# Patient Record
Sex: MALE | Race: NATIVE HAWAIIAN OR OTHER PACIFIC ISLANDER | HISPANIC OR LATINO
[De-identification: names, ages, dates, MRNs, and addresses within clinical notes are randomized per-mention and may not be internally consistent; named-entity substitution may affect disease eponyms.]

---

## 2024-08-12 ENCOUNTER — APPOINTMENT (OUTPATIENT)
Dept: ORTHOPEDIC SURGERY | Facility: CLINIC | Age: 29
End: 2024-08-12
Payer: COMMERCIAL

## 2024-08-12 DIAGNOSIS — M23.91 UNSPECIFIED INTERNAL DERANGEMENT OF RIGHT KNEE: ICD-10-CM

## 2024-08-12 PROBLEM — Z00.00 ENCOUNTER FOR PREVENTIVE HEALTH EXAMINATION: Status: ACTIVE | Noted: 2024-08-12

## 2024-08-12 PROCEDURE — 73562 X-RAY EXAM OF KNEE 3: CPT | Mod: RT

## 2024-08-12 PROCEDURE — 99203 OFFICE O/P NEW LOW 30 MIN: CPT

## 2024-08-13 PROBLEM — M23.91 INTERNAL DERANGEMENT OF RIGHT KNEE: Status: ACTIVE | Noted: 2024-08-13

## 2024-08-13 NOTE — ASSESSMENT
[FreeTextEntry1] : Discussed at length with patient exam imaging and high concern for ACL tear is possible posterior lateral corner injury there is some benefits detailed and we will obtain an MRI for further evaluation

## 2024-08-13 NOTE — PHYSICAL EXAM
[de-identified] : Right knee  Constitutional:  The patient is healthy-appearing and in no apparent distress.   Gait: The patient ambulates with a crutch assist knee immobilizer and a limp  Cardiovascular System:  The capillary refill is less than 2 seconds.   Skin:  There are no skin abnormalities. There is a moderate effusion  Right Knee:   Bony Palpation:  There is tenderness of the medial joint line.  There is tenderness of the lateral joint line. There is no tenderness of the medial femoral chondyle. There is no tenderness of the lateral femoral chondyle. There is no tenderness of the tibial tubercle. There is no tenderness of the superior patella. There is no tenderness of the inferior patella. There is no tenderness of the medial patellar facet. There is no tenderness of the lateral patellar facet.  Soft Tissue Palpation:  There is no tenderness of the medial retinaculum. There is no tenderness of the lateral retinaculum. There is no tenderness of the quadriceps tendon. There is no tenderness of the patella tendon. There is no tenderness of the ITB. There is no tenderness of the pes anserine.  Active Range of Motion:  The range of motion at the knee actively and passively is 5 - 90   Special Tests:  There is a negative Apley. There is a negative Steinmanns.  There is 3+ Lachman and Anterior Drawer. There is a negative Posterior Drawer.   There is mild varus laxity and no valgus laxity.  Strength:  There is 5/5 hip flexion and 5/5 knee flexion and extension.    Psychiatric:  The patient demonstrates a normal mood and affect and is active and alert   [de-identified] : X-ray right knee: There is no underlying arthritis and fracture mukund avulsions as described above on the CT
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
## 2024-08-13 NOTE — PHYSICAL EXAM
[de-identified] : Right knee  Constitutional:  The patient is healthy-appearing and in no apparent distress.   Gait: The patient ambulates with a crutch assist knee immobilizer and a limp  Cardiovascular System:  The capillary refill is less than 2 seconds.   Skin:  There are no skin abnormalities. There is a moderate effusion  Right Knee:   Bony Palpation:  There is tenderness of the medial joint line.  There is tenderness of the lateral joint line. There is no tenderness of the medial femoral chondyle. There is no tenderness of the lateral femoral chondyle. There is no tenderness of the tibial tubercle. There is no tenderness of the superior patella. There is no tenderness of the inferior patella. There is no tenderness of the medial patellar facet. There is no tenderness of the lateral patellar facet.  Soft Tissue Palpation:  There is no tenderness of the medial retinaculum. There is no tenderness of the lateral retinaculum. There is no tenderness of the quadriceps tendon. There is no tenderness of the patella tendon. There is no tenderness of the ITB. There is no tenderness of the pes anserine.  Active Range of Motion:  The range of motion at the knee actively and passively is 5 - 90   Special Tests:  There is a negative Apley. There is a negative Steinmanns.  There is 3+ Lachman and Anterior Drawer. There is a negative Posterior Drawer.   There is mild varus laxity and no valgus laxity.  Strength:  There is 5/5 hip flexion and 5/5 knee flexion and extension.    Psychiatric:  The patient demonstrates a normal mood and affect and is active and alert   [de-identified] : X-ray right knee: There is no underlying arthritis and fracture mukund avulsions as described above on the CT

## 2024-08-20 ENCOUNTER — APPOINTMENT (OUTPATIENT)
Dept: ORTHOPEDIC SURGERY | Facility: CLINIC | Age: 29
End: 2024-08-20
Payer: COMMERCIAL

## 2024-08-20 DIAGNOSIS — S83.511A SPRAIN OF ANTERIOR CRUCIATE LIGAMENT OF RIGHT KNEE, INITIAL ENCOUNTER: ICD-10-CM

## 2024-08-20 DIAGNOSIS — S83.231A COMPLEX TEAR OF MEDIAL MENISCUS, CURRENT INJURY, RIGHT KNEE, INITIAL ENCOUNTER: ICD-10-CM

## 2024-08-20 PROCEDURE — 99214 OFFICE O/P EST MOD 30 MIN: CPT

## 2024-08-20 NOTE — HISTORY OF PRESENT ILLNESS
[de-identified] : Last Visit: aug 12,24 Reason: right knee  Symptoms:  discomfort Physical therapy/ Home exercises: home exercises  Physical therapy/ Home exercises:

## 2024-08-20 NOTE — ASSESSMENT
[FreeTextEntry1] : Discussed with patient at length symptoms and diagnosis.  Lengthy discussion with patient regarding nonoperative and operative risks and benefits as well as surgical expectations and postop protocols and expectations, including the possibility that surgery may fail to satisfactorily resolve patient's condition / symptoms.   Patient informed that radiologic imaging as well as physical exam are aids to helping determine treatment plans/recommendations and that intra-operative evaluation will be undertaken and any surgical treatment will take intra-operative evaluation into consideration to aid in improving the patient's symptoms/condition.  Discussed treatment and allograft choices both autograft and allograft.  Patient expresses understanding and patient's questions were answered.  Patient ELECTS to proceed with surgery.

## 2024-08-20 NOTE — PHYSICAL EXAM
[de-identified] : Right knee  Constitutional:  The patient is healthy-appearing and in no apparent distress.   Gait: The patient ambulates with a crutch assist and a limp  Cardiovascular System:  The capillary refill is less than 2 seconds.   Skin:  There are no skin abnormalities. There is a moderate effusion  Right Knee:   Bony Palpation:  There is tenderness of the medial joint line.  There is tenderness of the lateral joint line. There is no tenderness of the medial femoral chondyle. There is no tenderness of the lateral femoral chondyle. There is no tenderness of the tibial tubercle. There is no tenderness of the superior patella. There is no tenderness of the inferior patella. There is no tenderness of the medial patellar facet. There is no tenderness of the lateral patellar facet.  Soft Tissue Palpation:  There is no tenderness of the medial retinaculum. There is no tenderness of the lateral retinaculum. There is no tenderness of the quadriceps tendon. There is no tenderness of the patella tendon. There is no tenderness of the ITB. There is no tenderness of the pes anserine.  Active Range of Motion:  The range of motion at the knee actively and passively is 0 - 90   Special Tests:  There is a negative Apley. There is a negative Steinmanns.  There is 3+ Lachman and Anterior Drawer. There is a negative Posterior Drawer.   There is mild varus laxity and no valgus laxity.  Strength:  There is 5/5 hip flexion and 5/5 knee flexion and extension.    Psychiatric:  The patient demonstrates a normal mood and affect and is active and alert   [de-identified] : MRI of RIGHT knee ([facility]):  There is a complete ACL tear, medial meniscus tear, grade 2 MCL tear.

## 2024-08-20 NOTE — HISTORY OF PRESENT ILLNESS
[de-identified] : Last Visit: aug 12,24 Reason: right knee  Symptoms:  discomfort Physical therapy/ Home exercises: home exercises  Physical therapy/ Home exercises:

## 2024-08-20 NOTE — PHYSICAL EXAM
[de-identified] : Right knee  Constitutional:  The patient is healthy-appearing and in no apparent distress.   Gait: The patient ambulates with a crutch assist and a limp  Cardiovascular System:  The capillary refill is less than 2 seconds.   Skin:  There are no skin abnormalities. There is a moderate effusion  Right Knee:   Bony Palpation:  There is tenderness of the medial joint line.  There is tenderness of the lateral joint line. There is no tenderness of the medial femoral chondyle. There is no tenderness of the lateral femoral chondyle. There is no tenderness of the tibial tubercle. There is no tenderness of the superior patella. There is no tenderness of the inferior patella. There is no tenderness of the medial patellar facet. There is no tenderness of the lateral patellar facet.  Soft Tissue Palpation:  There is no tenderness of the medial retinaculum. There is no tenderness of the lateral retinaculum. There is no tenderness of the quadriceps tendon. There is no tenderness of the patella tendon. There is no tenderness of the ITB. There is no tenderness of the pes anserine.  Active Range of Motion:  The range of motion at the knee actively and passively is 0 - 90   Special Tests:  There is a negative Apley. There is a negative Steinmanns.  There is 3+ Lachman and Anterior Drawer. There is a negative Posterior Drawer.   There is mild varus laxity and no valgus laxity.  Strength:  There is 5/5 hip flexion and 5/5 knee flexion and extension.    Psychiatric:  The patient demonstrates a normal mood and affect and is active and alert   [de-identified] : MRI of RIGHT knee ([facility]):  There is a complete ACL tear, medial meniscus tear, grade 2 MCL tear.

## 2024-08-29 RX ORDER — OXYCODONE AND ACETAMINOPHEN 5; 325 MG/1; MG/1
5-325 TABLET ORAL
Qty: 40 | Refills: 0 | Status: ACTIVE | COMMUNITY
Start: 2024-08-29 | End: 1900-01-01

## 2024-08-30 ENCOUNTER — APPOINTMENT (OUTPATIENT)
Age: 29
End: 2024-08-30

## 2024-08-30 PROCEDURE — 29875 ARTHRS KNEE SURG SYNVCT LMTD: CPT | Mod: RT,59

## 2024-08-30 PROCEDURE — 29888 ARTHRS AID ACL RPR/AGMNTJ: CPT | Mod: RT

## 2024-09-03 ENCOUNTER — APPOINTMENT (OUTPATIENT)
Dept: ORTHOPEDIC SURGERY | Facility: CLINIC | Age: 29
End: 2024-09-03
Payer: COMMERCIAL

## 2024-09-03 PROCEDURE — 99024 POSTOP FOLLOW-UP VISIT: CPT

## 2024-09-03 NOTE — BEGINNING OF VISIT
Blood pressure control is great.  Check basic metabolic panel.  Refill of olmesartan/chlorothiazide sent to pharmacy.   [Patient] : patient

## 2024-09-05 NOTE — HISTORY OF PRESENT ILLNESS
[de-identified] : s/p RIGHT knee arthroscopy with ACL reconstruction (hamstring Graftlink), medial menisectomy [de-identified] : Patient is 4 days postop.  States pain controlled.  States wearing brace.  Denies any paresthesias. [de-identified] : RIGHT knee:  Postop dressing intact and brace.  Dressing removed.  There is a moderate effusion c/w expectation.  There is ROM 0-35.  Negative Lachman.  NSLT L2-S1 [de-identified] : s/p RIGHT knee arthroscopy with ACL reconstruction (hamstring Graftlink), medial menisectomy [de-identified] : Reviewed with patient surgery and postop protocols.  Brace until follow-up.  Patient to follow-up in 1 week and to begin PT

## 2024-09-05 NOTE — HISTORY OF PRESENT ILLNESS
[de-identified] : s/p RIGHT knee arthroscopy with ACL reconstruction (hamstring Graftlink), medial menisectomy [de-identified] : Patient is 4 days postop.  States pain controlled.  States wearing brace.  Denies any paresthesias. [de-identified] : RIGHT knee:  Postop dressing intact and brace.  Dressing removed.  There is a moderate effusion c/w expectation.  There is ROM 0-35.  Negative Lachman.  NSLT L2-S1 [de-identified] : s/p RIGHT knee arthroscopy with ACL reconstruction (hamstring Graftlink), medial menisectomy [de-identified] : Reviewed with patient surgery and postop protocols.  Brace until follow-up.  Patient to follow-up in 1 week and to begin PT

## 2024-09-05 NOTE — HISTORY OF PRESENT ILLNESS
[de-identified] : s/p RIGHT knee arthroscopy with ACL reconstruction (hamstring Graftlink), medial menisectomy [de-identified] : Patient is 4 days postop.  States pain controlled.  States wearing brace.  Denies any paresthesias. [de-identified] : RIGHT knee:  Postop dressing intact and brace.  Dressing removed.  There is a moderate effusion c/w expectation.  There is ROM 0-35.  Negative Lachman.  NSLT L2-S1 [de-identified] : s/p RIGHT knee arthroscopy with ACL reconstruction (hamstring Graftlink), medial menisectomy [de-identified] : Reviewed with patient surgery and postop protocols.  Brace until follow-up.  Patient to follow-up in 1 week and to begin PT

## 2024-09-10 ENCOUNTER — APPOINTMENT (OUTPATIENT)
Dept: ORTHOPEDIC SURGERY | Facility: CLINIC | Age: 29
End: 2024-09-10
Payer: COMMERCIAL

## 2024-09-10 DIAGNOSIS — S83.511A SPRAIN OF ANTERIOR CRUCIATE LIGAMENT OF RIGHT KNEE, INITIAL ENCOUNTER: ICD-10-CM

## 2024-09-10 DIAGNOSIS — S83.231A COMPLEX TEAR OF MEDIAL MENISCUS, CURRENT INJURY, RIGHT KNEE, INITIAL ENCOUNTER: ICD-10-CM

## 2024-09-10 PROCEDURE — 99024 POSTOP FOLLOW-UP VISIT: CPT

## 2024-09-10 NOTE — HISTORY OF PRESENT ILLNESS
[de-identified] : s/p RIGHT knee arthroscopy with ACL reconstruction (hamstring Graftlink), medial menisectomy [de-identified] : Patient is 11 days postop.  States pain controlled.  States wearing brace.  Denies any paresthesias.  States not needing pain medications and scheduled to start PT next week [de-identified] : RIGHT knee:  Postop dressing intact and brace.  Mild effusion c/w expectation.  There is ROM 0-75.  Negative Lachman.  NSLT L2-S1.  Wounds CDI and sutures removed. [de-identified] : s/p RIGHT knee arthroscopy with ACL reconstruction (hamstring Graftlink), medial menisectomy [de-identified] : Reviewed with patient surgery and postop protocols.  Begin PT and wean out of brace.  Follow-up in 3 weeks

## 2024-10-01 ENCOUNTER — APPOINTMENT (OUTPATIENT)
Dept: ORTHOPEDIC SURGERY | Facility: CLINIC | Age: 29
End: 2024-10-01
Payer: COMMERCIAL

## 2024-10-01 DIAGNOSIS — S83.231A COMPLEX TEAR OF MEDIAL MENISCUS, CURRENT INJURY, RIGHT KNEE, INITIAL ENCOUNTER: ICD-10-CM

## 2024-10-01 DIAGNOSIS — S83.511A SPRAIN OF ANTERIOR CRUCIATE LIGAMENT OF RIGHT KNEE, INITIAL ENCOUNTER: ICD-10-CM

## 2024-10-01 PROCEDURE — 99024 POSTOP FOLLOW-UP VISIT: CPT

## 2024-10-01 NOTE — HISTORY OF PRESENT ILLNESS
[de-identified] : s/p RIGHT knee arthroscopy with ACL reconstruction (hamstring Graftlink), medial menisectomy [de-identified] : Patient is 4.5 weeks postop.  States pain controlled.  States wearing brace.  Denies any paresthesias.  States PT going well. [de-identified] : RIGHT knee:   Mild effusion c/w expectation.  There is ROM 0-115.  Negative Lachman.  NSLT L2-S1.  Wounds CDI and sutures removed. [de-identified] : s/p RIGHT knee arthroscopy with ACL reconstruction (hamstring Graftlink), medial menisectomy [de-identified] : Reviewed with patient surgery and postop protocols.  Advance PT and ROM.  Follow-up in 4 weeks

## 2024-11-05 ENCOUNTER — APPOINTMENT (OUTPATIENT)
Dept: ORTHOPEDIC SURGERY | Facility: CLINIC | Age: 29
End: 2024-11-05
Payer: COMMERCIAL

## 2024-11-05 DIAGNOSIS — S83.511A SPRAIN OF ANTERIOR CRUCIATE LIGAMENT OF RIGHT KNEE, INITIAL ENCOUNTER: ICD-10-CM

## 2024-11-05 DIAGNOSIS — S83.231A COMPLEX TEAR OF MEDIAL MENISCUS, CURRENT INJURY, RIGHT KNEE, INITIAL ENCOUNTER: ICD-10-CM

## 2024-11-05 PROCEDURE — 99024 POSTOP FOLLOW-UP VISIT: CPT

## 2024-12-17 ENCOUNTER — APPOINTMENT (OUTPATIENT)
Dept: ORTHOPEDIC SURGERY | Facility: CLINIC | Age: 29
End: 2024-12-17
Payer: COMMERCIAL

## 2024-12-17 DIAGNOSIS — S83.511A SPRAIN OF ANTERIOR CRUCIATE LIGAMENT OF RIGHT KNEE, INITIAL ENCOUNTER: ICD-10-CM

## 2024-12-17 DIAGNOSIS — S83.231A COMPLEX TEAR OF MEDIAL MENISCUS, CURRENT INJURY, RIGHT KNEE, INITIAL ENCOUNTER: ICD-10-CM

## 2024-12-17 PROCEDURE — 99213 OFFICE O/P EST LOW 20 MIN: CPT

## 2024-12-17 RX ORDER — NABUMETONE 500 MG/1
500 TABLET, FILM COATED ORAL
Qty: 60 | Refills: 0 | Status: ACTIVE | COMMUNITY
Start: 2024-12-17 | End: 1900-01-01

## 2025-02-19 DIAGNOSIS — S83.231A COMPLEX TEAR OF MEDIAL MENISCUS, CURRENT INJURY, RIGHT KNEE, INITIAL ENCOUNTER: ICD-10-CM

## 2025-02-19 DIAGNOSIS — S83.511A SPRAIN OF ANTERIOR CRUCIATE LIGAMENT OF RIGHT KNEE, INITIAL ENCOUNTER: ICD-10-CM

## 2025-02-25 ENCOUNTER — APPOINTMENT (OUTPATIENT)
Dept: ORTHOPEDIC SURGERY | Facility: CLINIC | Age: 30
End: 2025-02-25
Payer: COMMERCIAL

## 2025-02-25 DIAGNOSIS — S83.511A SPRAIN OF ANTERIOR CRUCIATE LIGAMENT OF RIGHT KNEE, INITIAL ENCOUNTER: ICD-10-CM

## 2025-02-25 PROCEDURE — 99213 OFFICE O/P EST LOW 20 MIN: CPT
